# Patient Record
Sex: FEMALE | Race: WHITE
[De-identification: names, ages, dates, MRNs, and addresses within clinical notes are randomized per-mention and may not be internally consistent; named-entity substitution may affect disease eponyms.]

---

## 2017-01-31 ENCOUNTER — RECORDS - HEALTHEAST (OUTPATIENT)
Dept: ADMINISTRATIVE | Facility: OTHER | Age: 72
End: 2017-01-31

## 2017-03-20 ENCOUNTER — RECORDS - HEALTHEAST (OUTPATIENT)
Dept: ADMINISTRATIVE | Facility: OTHER | Age: 72
End: 2017-03-20

## 2017-12-19 ENCOUNTER — RECORDS - HEALTHEAST (OUTPATIENT)
Dept: ADMINISTRATIVE | Facility: OTHER | Age: 72
End: 2017-12-19

## 2018-01-12 ENCOUNTER — RECORDS - HEALTHEAST (OUTPATIENT)
Dept: ADMINISTRATIVE | Facility: OTHER | Age: 73
End: 2018-01-12

## 2018-02-02 ENCOUNTER — RECORDS - HEALTHEAST (OUTPATIENT)
Dept: ADMINISTRATIVE | Facility: OTHER | Age: 73
End: 2018-02-02

## 2018-02-21 ENCOUNTER — RECORDS - HEALTHEAST (OUTPATIENT)
Dept: ADMINISTRATIVE | Facility: OTHER | Age: 73
End: 2018-02-21

## 2018-03-27 ENCOUNTER — RECORDS - HEALTHEAST (OUTPATIENT)
Dept: ADMINISTRATIVE | Facility: OTHER | Age: 73
End: 2018-03-27

## 2018-05-01 ENCOUNTER — RECORDS - HEALTHEAST (OUTPATIENT)
Dept: ADMINISTRATIVE | Facility: OTHER | Age: 73
End: 2018-05-01

## 2018-05-30 ENCOUNTER — RECORDS - HEALTHEAST (OUTPATIENT)
Dept: ADMINISTRATIVE | Facility: OTHER | Age: 73
End: 2018-05-30

## 2018-07-18 ENCOUNTER — AMBULATORY - HEALTHEAST (OUTPATIENT)
Dept: SCHEDULING | Facility: CLINIC | Age: 73
End: 2018-07-18

## 2018-07-18 DIAGNOSIS — C18.9 COLON ADENOCARCINOMA (H): ICD-10-CM

## 2018-07-26 ENCOUNTER — OFFICE VISIT - HEALTHEAST (OUTPATIENT)
Dept: WOUND CARE | Facility: HOSPITAL | Age: 73
End: 2018-07-26

## 2018-07-26 DIAGNOSIS — C18.9 COLON ADENOCARCINOMA (H): ICD-10-CM

## 2018-07-27 ASSESSMENT — MIFFLIN-ST. JEOR: SCORE: 1220.71

## 2018-07-30 ENCOUNTER — ANESTHESIA - HEALTHEAST (OUTPATIENT)
Dept: SURGERY | Facility: HOSPITAL | Age: 73
End: 2018-07-30

## 2018-07-31 ENCOUNTER — SURGERY - HEALTHEAST (OUTPATIENT)
Dept: SURGERY | Facility: HOSPITAL | Age: 73
End: 2018-07-31

## 2018-07-31 ASSESSMENT — MIFFLIN-ST. JEOR: SCORE: 1220.71

## 2018-08-01 ENCOUNTER — HOME CARE/HOSPICE - HEALTHEAST (OUTPATIENT)
Dept: HOME HEALTH SERVICES | Facility: HOME HEALTH | Age: 73
End: 2018-08-01

## 2018-08-01 ASSESSMENT — MIFFLIN-ST. JEOR
SCORE: 1232.5
SCORE: 1220.71

## 2018-08-10 ENCOUNTER — AMBULATORY - HEALTHEAST (OUTPATIENT)
Dept: SCHEDULING | Facility: CLINIC | Age: 73
End: 2018-08-10

## 2018-08-10 DIAGNOSIS — Z71.89 OSTOMY NURSE CONSULTATION: ICD-10-CM

## 2018-08-16 ENCOUNTER — OFFICE VISIT - HEALTHEAST (OUTPATIENT)
Dept: WOUND CARE | Facility: HOSPITAL | Age: 73
End: 2018-08-16

## 2018-08-16 DIAGNOSIS — Z71.89 OSTOMY NURSE CONSULTATION: ICD-10-CM

## 2018-08-20 ENCOUNTER — RECORDS - HEALTHEAST (OUTPATIENT)
Dept: ADMINISTRATIVE | Facility: OTHER | Age: 73
End: 2018-08-20

## 2018-09-04 ENCOUNTER — RECORDS - HEALTHEAST (OUTPATIENT)
Dept: ADMINISTRATIVE | Facility: OTHER | Age: 73
End: 2018-09-04

## 2018-09-18 ENCOUNTER — RECORDS - HEALTHEAST (OUTPATIENT)
Dept: ADMINISTRATIVE | Facility: OTHER | Age: 73
End: 2018-09-18

## 2018-10-02 ENCOUNTER — RECORDS - HEALTHEAST (OUTPATIENT)
Dept: ADMINISTRATIVE | Facility: OTHER | Age: 73
End: 2018-10-02

## 2018-10-11 ENCOUNTER — OFFICE VISIT - HEALTHEAST (OUTPATIENT)
Dept: WOUND CARE | Facility: HOSPITAL | Age: 73
End: 2018-10-11

## 2018-10-11 DIAGNOSIS — C18.9 COLON ADENOCARCINOMA (H): ICD-10-CM

## 2018-10-12 ENCOUNTER — RECORDS - HEALTHEAST (OUTPATIENT)
Dept: ADMINISTRATIVE | Facility: OTHER | Age: 73
End: 2018-10-12

## 2018-10-16 ENCOUNTER — RECORDS - HEALTHEAST (OUTPATIENT)
Dept: ADMINISTRATIVE | Facility: OTHER | Age: 73
End: 2018-10-16

## 2018-10-23 ENCOUNTER — RECORDS - HEALTHEAST (OUTPATIENT)
Dept: ADMINISTRATIVE | Facility: OTHER | Age: 73
End: 2018-10-23

## 2018-11-13 ENCOUNTER — RECORDS - HEALTHEAST (OUTPATIENT)
Dept: ADMINISTRATIVE | Facility: OTHER | Age: 73
End: 2018-11-13

## 2021-06-01 VITALS — WEIGHT: 164.6 LBS | BODY MASS INDEX: 27.42 KG/M2 | HEIGHT: 65 IN

## 2021-06-19 NOTE — ANESTHESIA CARE TRANSFER NOTE
Last vitals:   Vitals:    07/31/18 0940   BP: 133/72   Pulse: (!) 108   Resp: 12   Temp: 37  C (98.6  F)   SpO2: 100%     Pt brought to PACU on 10L facemask. Monitors applied. VSS upon arrival.    Patient's level of consciousness is drowsy  Spontaneous respirations: yes  Maintains airway independently: yes  Dentition unchanged: yes  Oropharynx: oropharynx clear of all foreign objects    QCDR Measures:  ASA# 20 - Surgical Safety Checklist: WHO surgical safety checklist completed prior to induction  PQRS# 430 - Adult PONV Prevention: 4558F - Pt received => 2 anti-emetic agents (different classes) preop & intraop  ASA# 8 - Peds PONV Prevention: NA - Not pediatric patient, not GA or 2 or more risk factors NOT present  PQRS# 424 - Jen-op Temp Management: 4559F - At least one body temp DOCUMENTED => 35.5C or 95.9F within required timeframe  PQRS# 426 - PACU Transfer Protocol: - Transfer of care checklist used  ASA# 14 - Acute Post-op Pain: ASA14B - Patient did NOT experience pain >= 7 out of 10

## 2021-06-19 NOTE — ANESTHESIA POSTPROCEDURE EVALUATION
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   Anesthesia type: general    Patient location: PACU  Last vitals:   Vitals:    07/31/18 1030   BP: 118/71   Pulse: 66   Resp:    Temp:    SpO2: 95%     Post vital signs: stable  Level of consciousness: awake and responds to simple questions  Post-anesthesia pain: pain controlled  Post-anesthesia nausea and vomiting: no  Pulmonary: unassisted, return to baseline  Cardiovascular: stable and blood pressure at baseline  Hydration: adequate  Anesthetic events: no    QCDR Measures:  ASA# 11 - Jen-op Cardiac Arrest: ASA11B - Patient did NOT experience unanticipated cardiac arrest  ASA# 12 - Jen-op Mortality Rate: ASA12B - Patient did NOT die  ASA# 13 - PACU Re-Intubation Rate: ASA13B - Patient did NOT require a new airway mgmt  ASA# 10 - Composite Anes Safety: ASA10A - No serious adverse event    Additional Notes:

## 2021-06-19 NOTE — ANESTHESIA PREPROCEDURE EVALUATION
Anesthesia Evaluation      Patient summary reviewed     Airway   Mallampati: II  Neck ROM: full   Pulmonary - negative ROS and normal exam                          Cardiovascular   (+) hypertension, CAD, dysrhythmias, ,     Rhythm: irregular  Rate: abnormal,         Neuro/Psych - negative ROS     Endo/Other - negative ROS      GI/Hepatic/Renal    (+) GERD,   chronic renal disease,     Comments: Colon ca            Dental    (+) upper dentures and lower dentures                Chemistry        Component Value Date/Time     07/19/2018 0745    K 3.8 07/19/2018 0745     07/19/2018 0745    CO2 19 (L) 07/19/2018 0745    BUN 31 (H) 07/19/2018 0745    CREATININE 1.64 (H) 07/19/2018 0745     (H) 07/19/2018 0745        Component Value Date/Time    CALCIUM 10.0 07/19/2018 0745    ALKPHOS 44 (L) 07/19/2018 0745    AST 31 07/19/2018 0745    ALT 20 07/19/2018 0745    BILITOT 0.8 07/19/2018 0745        Lab Results   Component Value Date    WBC 10.6 07/19/2018    HGB 14.2 07/19/2018    HCT 41.5 07/19/2018    MCV 93 07/19/2018     07/19/2018                Anesthesia Plan  Planned anesthetic: general endotracheal    ASA 3   Induction: intravenous   Anesthetic plan and risks discussed with: patient    Post-op plan: routine recovery

## 2021-06-26 NOTE — PROGRESS NOTES
Progress Notes by Deep Hahn RN, WOC at 10/11/2018 10:15 AM     Author: Deep Hahn RN, WOC Service: -- Author Type: Registered Nurse    Filed: 10/11/2018  1:32 PM Encounter Date: 10/11/2018 Status: Signed    : Deep Hahn RN, WOADRIANE (Registered Nurse)       OUTPATIENT OSTOMY ASSESSMENT    Patients mode of arrival: Ambulatory    INTAKE  Type of Stoma: Permanent Colostomy  Anticipated date of takedown: NA    Diagnosis Pertinent to Stoma:Colon/Rectal Cancer Date of Diagnosis: 2016  Type of Surgery: Colostomy    Surgery Date: 7/31/18  Surgeon:[facility]: [facility]    Purpose of this visit:Peristomal Complications    Present for Teaching Session: Patient and Family Member   Present: NA    Pertinent Information: Painful sometimes when stool comes out    Current Equipment:    Product # Brand Description   Pouch Unknown Convatec Closed 2 piece   Flange Unknown Convatec 1 inch convex   Paste [id number] Coloplast Brava ring   Powder  Coloplast Brava powder   Deodorizer                    Pouch Change Frequency: Every 2-3 days  Provider of Care: Emptying: self Pouch Change: self    ASSESSMENT  Stoma Size: Oval 5/8 x 3/4 inches  Protrusion: Retraction 1 centimeters  Vascularity: Pink  Mucocutaneous Juncture: Intact  Peristomal Skin: Abnormal Partial thickness denudement extending out up to 2cm circumferentially   Skin creases at 3 and 9 o'clock when patient sits up      Wound: No  Current Wound Care: NA    TREATMENT  Applied: stoma powder and No Sting to denudement x2    INTERVENTIONS  Refitting done, Educated on peristomal skin treatment and Educated on pouch change procedure  Miscellaneous Interventions: Signed up for Col[employer] Care or Secure Start    INSTRUCTIONS GIVEN  WO Role, Anatomy, Bathing: Ostomy supplies are waterproof., Fluids: Drink 6-8 glasses of fluids daily , Pouching Products: Showed pouching system , Insurance and Ordering supplies    PLAN  Change in  Supplies: See Outpatient Ostomy Instructions      Total Time Spent with Patient: 40 minutes.  Education time: 20 minutes.

## 2021-06-26 NOTE — PROGRESS NOTES
Progress Notes by Deep Hahn RN, WOC at 8/16/2018  9:30 AM     Author: Deep Hahn RN, WOC Service: -- Author Type: Registered Nurse    Filed: 8/16/2018 10:44 AM Encounter Date: 8/16/2018 Status: Signed    : Deep Hahn RN, WOC (Registered Nurse)       OUTPATIENT OSTOMY ASSESSMENT    Patients mode of arrival: Ambulatory    INTAKE  Type of Stoma: Permanent Colostomy  Anticipated date of takedown: NA    Diagnosis Pertinent to Stoma:Colon/Rectal Cancer Date of Diagnosis: 2016  Type of Surgery: Colostomy    Surgery Date: 7/31/18  Surgeon:[facility]: [facility]    Purpose of this visit:Checkup:2 week    Present for Teaching Session: Patient and Family Member   Present: CEDRICK    Pertinent Information: Was having leaking issues, home care RN helped find new pouch that is working now    Current Equipment:    Product # Brand Description   Pouch Unknown Convatec 2 piece    Flange Unknown Convatec 1 1/4 convex    Paste [id number] Coloplast Protective seal   Powder      Deodorizer                    Pouch Change Frequency: This pouch has been on for 3 days  Provider of Care: Emptying: self Pouch Change: home care RN and patient    ASSESSMENT  Stoma Size: Round 7/8 inches  Protrusion: Retraction 1 centimeters  Vascularity: Pink  Mucocutaneous Juncture: Intact  Peristomal Skin: Abnormal Partial thickness denudement extending out up to 1.5cm circumferentially    Wound: No  Current Wound Care: NA    TREATMENT  Applied: Stoma powder and No Sting x2 to denudement    INTERVENTIONS  Refitting done, Educated on peristomal skin treatment and Educated on pouch change procedure    INSTRUCTIONS GIVEN  WOC Role, Anatomy, Fluids: Drink 6-8 glasses of fluids daily , Pouching Products: Showed pouching system  and Ordering supplies    PLAN  Continue same Pouching System.  Patient would like to stay with larger pouching system because leaking is controlled.  She understands this will not fully  protect the skin around the stoma (it is also not likely possible to fully protect skin around site with her retraction).      Total Time Spent with Patient: 35 minutes.  Education time: 15 minutes.